# Patient Record
Sex: MALE | Race: BLACK OR AFRICAN AMERICAN
[De-identification: names, ages, dates, MRNs, and addresses within clinical notes are randomized per-mention and may not be internally consistent; named-entity substitution may affect disease eponyms.]

---

## 2020-08-25 NOTE — MRI REPORT
PROCEDURE:  Arthrogram Shoulder RT

 

INDICATIONS:  PAIN IN RT SHOULDER

 

CONTRAST: 12 mL of dilute intra-articular Gadolinium contrast solution

 

TECHNIQUE:  

After the administration of 12 mL of dilute intra-articular Gadolinium contrast, oblique coronal T1 a
nd T2 spin echo with fat saturation, oblique sagittal T1 spin echo with and without fat saturation, o
blique sagittal T2 fast spin echo with fat saturation, axial T1 spin echo with fat saturation through
 the shoulder.  

 

COMPARISON:   and fluoroscopic images from arthrogram injection performed earlier the same day.

 

FINDINGS:  

Image quality:  Excellent.  

 

Rotator cuff: There is mild supraspinatus and infraspinatus tendinosis. The teres minor tendon is int
act. The subscapularis tendon is also intact. There is no significant rotator cuff muscle atrophy.

 

Bones and bursae:  There is a small shallow chronic Hill-Sachs lesion in the posterior superior humer
al head measuring approximately 17 x 12 x 3 mm. No acute osseous edema is seen. Minimal degenerative 
changes are seen in the acromioclavicular joint. There is no significant subacromial/subdeltoid bursa
l fluid. 

 

Capsule and soft tissues:  Focal full-thickness cartilage loss is present at the anterior-inferior gl
enoid without significant loss of glenoid bone stock. There is tearing of the adjacent anterior infer
ior labrum with minimal displacement and an intact periosteal attachment. An additional small nondisp
laced tear is seen in the posterior superior labrum. The long head of the biceps tendon demonstrates 
normal location and morphology.  No intra-articular bodies.  

 

IMPRESSION:  

1.  Minimally displaced tear of the anterior inferior glenoid labrum with intact periosteal attachmen
t. An additional small nondisplaced tear is seen in the posterior superior labrum.

 

2.  Focal full-thickness cartilage loss at the anterior inferior glenoid without significant loss of 
glenoid bone stock.

 

3.  Small shallow chronic Hill-Sachs lesion at the posterior superior humeral head measuring 17 x 12 
x 3 mm.

 

4.  Mild supraspinatus and infraspinatus tendinosis.

 

Reviewed by: Joaquín Asher MD on 8/25/2020 1:31 PM PDT

Approved by: Joaquín Asher MD on 8/25/2020 1:31 PM PDT

 

 

Station ID:  [id number]

## 2020-08-25 NOTE — XRAY REPORT
PROCEDURE: Arthrogram Needle Placement

 

INDICATIONS: PAIN IN RT SHOULDER

   

TECHNIQUE:  

The indications, alternatives, benefits, risks, and complications of the procedure were explained to 
the patient.  Written informed consent was obtained and placed in the chart.  The shoulder was examin
ed fluoroscopically and a site for needle placement chosen for entry into the glenohumeral joint from
 an anterior approach.  The skin was prepped and draped in the usual fashion, and 1% lidocaine infilt
rated from skin down to joint capsule.  A spinal needle was inserted into the glenohumeral joint, and
 a small amount of iodinated contrast media injected to confirm intra-articular placement of the need
le tip.  This was followed by approximately 12 mL dilute solution of a gadolinium containing MR contr
ast agent.  

 

The needle was removed and a dressing was applied.  The patient was given postprocedural instructions
 and sent to the MR suite for MR imaging.  

 

FINDINGS:  

A single fluoroscopic spot image demonstrates intra-articular location of injected iodinated contrast
.

 

IMPRESSION:  

Successful fluoroscopically guided administration of dilute Gadolinium solution into the shoulder ritchie boucher for MR arthrogram.  

 

Reviewed by: Jeronimo Jensen MD on 8/25/2020 10:36 AM PDT

Approved by: Jeronimo Jensen MD on 8/25/2020 10:36 AM PDT

 

 

Station ID:  [id number]

## 2020-10-09 ENCOUNTER — HOSPITAL ENCOUNTER (OUTPATIENT)
Dept: HOSPITAL 76 - SDS | Age: 25
Discharge: HOME | End: 2020-10-09
Attending: ORTHOPAEDIC SURGERY
Payer: COMMERCIAL

## 2020-10-09 VITALS — SYSTOLIC BLOOD PRESSURE: 125 MMHG | DIASTOLIC BLOOD PRESSURE: 85 MMHG
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
## 2020-10-09 DIAGNOSIS — S46.811A: ICD-10-CM

## 2020-10-09 DIAGNOSIS — S43.491A: Primary | ICD-10-CM
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
## 2020-10-09 DIAGNOSIS — M65.811: ICD-10-CM

## 2020-10-09 DIAGNOSIS — S43.431A: ICD-10-CM

## 2020-10-09 DIAGNOSIS — M75.21: ICD-10-CM
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
## 2020-10-09 NOTE — OPERATIVE REPORT
Operative Report





- General


Procedure Date: 10/09/20





- Other


Other Information/Narrative: 





Date of Procedure: October 9, 2020





Planned Procedure: Right shoulder arthroscopy, labral repair, possible biceps 

tenodesis





Pre-op diagnosis: Right shoulder instability and labral tear





Procedure performed: Right shoulder arthroscopy, labral repair, open subpectoral

biceps tenodesis, GLAD debridement, limited intra-articular debridement





Post-op diagnosis: Right shoulder instability, anterior inferior labral tear, 

biceps tendinitis/tearing, anterior GLAD lesion, synovitis





Primary Surgeon: RAJINDER CHAND





Secondary Surgeon: MARLEY PEREZ





Anesthesia: General endotracheal, interscalene block





EBL: 10 ml





IMPLANTS: 


Arthrex knotless suture tack x5, Arthrex fiber tack x1 for biceps tenodesis





POSTOPERATIVE PLAN:


0-2 weeks-Sling at all times.  Pendulum exercises 5 times per day.  


2-6 weeks-Passive range of motion with the following limits: FF to 90, ER to 20,

abduction to 90


6-12 weeks-Active range of motion in all planes without limitation.  Isometric 

rotator cuff strengthening is allowed


12-16 weeks-Gradually increase strengthening


16 weeks and beyond-Introduce dynamic activities





EXAMINATION UNDER ANESTHESIA:


ROM: Forward flexion 180, abduction 180, ABER 90, ABIR 90


Anterior load and shift: 2+


Posterior load and shift: 1+


Inferior sulcus: Negative





ARTHROSCOPIC FINDINGS: 


Rotator interval: Synovitis and fraying in the rotator interval, fraying of the 

[facility] L


Biceps tendon & SLAP: The biceps tendon was longitudinally torn, and there is a 

type II SLAP lesion.  The biceps anchor was probed and felt to be unstable on 

the superior glenoid


Subscapularis: Intact


Rotator Cuff: Intact


HAGL: None


Labrum: The anterior and inferior labrum was somewhat diminutive, there was an 

unstable crack between the anterior labrum and the glenoid, this was explored 

and found to propagate to approximately the 7 o'clock position.  Posterior 

labrum was stable to probing.


Glenoid Cartilage: There is a large anterior glad lesion near the equator, this 

was debrided with a sucker shaver and partially covered during the labral 

repair.


Humeral Head Cartilage: Some mild anterior wear, small shallow Hill-Sachs lesion

posteriorly.





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

self-reduce. Since that time he has had multiple instability events.  

Nonoperative management failed to resolve symptoms.  The risks, benefits, and 

alternatives were discussed.  Risks included pain, bleeding, infection, damage 

to nearby structures, lack of symptom relief, implant complications, stiffness, 

need for further surgeries, DVT, PE, stroke, and even death.  He signed a 

written consent form.





PROCEDURE IN DETAIL: The patient was met in the preoperative holding on the day 

of the procedure.  Operative extremity was signed.  Consent was verified.  he 

desired to proceed.  Regional anesthesia was obtained in the preoperative area. 

They were brought to the operating room and surrendered to anesthesia.  Once 

general anesthesia was obtained they were placed in the lateral decubitus 

position with the operative side up.  An axillary roll was placed and all bony 

prominences were well-padded.  They were then prepped and draped in the standard

sterile fashion.  A surgical timeout was held to confirm the patient procedure, 

identity, procedure, laterality, allergies, images, and antibiotics.  All were 

in agreement we proceeded.  





Balanced suspension was applied and a standard diagnostic arthroscopy was 

performed utilizing posterior and anterior superior portal sites.  The anterior 

superior portal site was created under direct visualization.  The findings of 

the diagnostic arthroscopy can be found above.  The synovitis and frayed labral 

tissue were debrided to improve visibility. The biceps tendon was cut using a 

combination of a meniscal biter and an RF ablation wand. The stump was debrided 

to the level of the labrum. The frayed labral tissue in the SLAP region was 

debrided to a stable rim.





 An anterior mid glenoid portal told just above the subscapularis was 

established under direct visualization. The camera was shifted to the anterior-

superior viewing portal and then the labrum was prepared using a combination of 

high and low angled elevators, the arthroscopic sucker shaver and the pineapple 

rasp.  The extent of the labral tear was from approximately 3:00 anteriorly near

the GLAD lesion progressing inferiorly just past 6:00.  The labrum was 

systematically freed up until it had good mobility and the subscap was visible 

through the interval between the labrum and the glenoid.  The arthroscopic 

sucker shaver was used to debride residual soft issue in the GLAD lesion as well

as to debride the edge of the cartilage to make room for anchor placement.  The 

pineapple rasp was used to debride any remaining soft tissue off the glenoid 

neck.  A posterior percutaneous portal was established using the Arthrex system,

and anchors were sequentially placed from approximately 7:00 posterior moving 

anteriorly.  The first anchor was placed at 7 o'clock, and the appropriate 

suture lasso was used to pass the suture around the capsulolabral tissue.  The 

suture was then secured in knotless fashion with good tension. This process was 

repeated using the percutaneous portal for the 6 o'clock anchor.  These anchors 

resulted in a nice superior capsular shift, closing down some of the space in 

the pouch.  Anterior anchors were then placed through the mid glenoid portal at 

approximately the 5:00, 4:00 and 3 o'clock positions.  The uppermost anchors 

were brought slightly more onto the face to help cover the anterior portion of 

the glenoid lesion.  Following repair, there was recreation of good 

anteroinferior labral bumper.  Pictures were taken and then the lateral 

distraction was removed.  The humeral head appeared well centered on the 

glenoid.  Final pictures were taken and the fluid was drained from the shoulder 

and the cannulas removed. 





We then turned our attention to the biceps tenodesis, a 5 cm incision was made 

near the axillary fold centered over the pectoralis major tendon.  

Electrocautery was used to obtain hemostasis.  The fascia was opened with 

dissection scissors.  Blunt digital dissection was used to identify the 

intertubercular groove just under the pectoralis major tendon.  The long head of

the biceps tendon was visualized within this interval.  The short head of the 

biceps was retracted with my finger and the right angle was used to deliver the 

tendon of the long head of the biceps out of the wound.  A key elevator was used

to prepare the groove and a fibertack anchor was placed high in the groove in 

standard fashion.  The biceps tendon was debrided to remove the tenosynovium and

the tendon was marked 2cm proximal to the musculotendinous junction. The biceps 

tendon was then whipstitched using the suture from the fibertack anchor from the

2cm macy, to the musculotendinous junction, and back 2 cm proximal.  The suture 

was tensioned, reducing the tendon into the groove/prepared bed.  The tendon was

secured with 6 reversing half-hitches with alternating posts.  Following 

fixation there was appropriate tension on the biceps tendon.  The suture limbs 

were cut and the wound was irrigated.  The subcutaneous tissue was closed using 

interrupted 2-0 Vicryl followed by running 3-0 Monocryl in subcuticular fashion.





The portal sites were then closed with 3-0 Monocryl buried. Dermabond was 

applied to the biceps incision followed by ster-strips. Mastisol and Steri-

Strips were applied to the portals.  10 mL of quarter percent Marcaine was 

injected around the biceps tenodesis incision.  Xeroform was applied to the 

portal incisons followed by a sterile dressing and a sling.  He was awakened and

transferred to the recovery room.

## 2020-10-09 NOTE — ANESTHESIA
Pre-Anesthesia VS, & Labs





- Diagnosis





Right Labral Tear





- Procedure





Shoulder Arthroscopy with SLAP Repair


Vital Signs: 





                                        











Temp Pulse Resp BP Pulse Ox


 


 37.5 C   71   16   122/74   100 


 


 10/09/20 10:00  10/09/20 10:00  10/09/20 10:00  10/09/20 10:00  10/09/20 10:00











Height: 6 ft 4 in


Weight (kg): 88.45 kg


Body Mass Index: 23.7


BMI Classification: Healthy weight





- NPO


>8 hours





Home Medications and Allergies


Home Medications: 


Ambulatory Orders





No Known Home Medications  10/05/20 











                                        





No Known Home Medications  10/05/20 








Allergies/Adverse Reactions: 


                                    Allergies











Allergy/AdvReac Type Severity Reaction Status Date / Time


 


No Known Drug Allergies Allergy   Verified 10/05/20 14:58














Anes History & Medical History





- Anesthetic History


Anesthesia Complications: reports: No previous complications (Only had STO under

sedation)


Family history of Anesthesia Complications: Denies


Family history of Malignant Hyperthermia: Denies





- Medical History


Cardiovascular: reports: None


Pulmonary: reports: None


Gastrointestinal: reports: None


Urinary: reports: None


Musculoskeletal: reports: Other (Chronic intermittant back pain. Set up for PT.)


Endocrine/Autoimmune: reports: None


Skin: reports: None


Smoking Status: Never smoker


Psychosocial: reports: No issues indicated


History of Cancer?: No





- Surgical History


Eyes Ears Nose Throat (EENT): Other (STO)





Exam


General: Alert, Oriented x3, Cooperative, No acute distress


Dental: WNL


Mouth Opening: Greater than 4 Fingerbreadths


Neck Mobility: Normal


Mallampati classification: I


Thyromental Distance: greater than 6 cm


Respiratory: Lungs clear


Cardiovascular: Regular rate


Mental/Cognitive Status: Alert/Oriented X3





Plan


Anesthesia Type: General, Supraclavicular Block


Regional Block: Per Surgeon's request for Post Op pain control


Consent for Procedure(s) Verified and Reviewed: Yes


Code Status: Attempt Resuscitation


ASA classification: 1-Healthy patient


Is this case an emergency?: No (Discussed anesthetics. Quetions answered. 

Consent signed.)

## 2020-10-09 NOTE — ANESTHESIA POST OP EVALUATION
Anesthesia Post Eval





- Post Anesthesia Eval


Vitals: 





                                Last Vital Signs











Temp  36.2 C L  10/09/20 16:25


 


Pulse  63   10/09/20 16:55


 


Resp  17   10/09/20 16:55


 


BP  125/85 H  10/09/20 16:55


 


Pulse Ox  96   10/09/20 16:55











CV Function Including HR & BP: positive: Stable


Pain Control: positive: Satisfactory


Nausea & Vomiting: positive: Negative


Mental Status: positive: Baseline


Respiratory Status: Airway Patent


Hydration Status: Satisfactory

## 2021-08-16 ENCOUNTER — HOSPITAL ENCOUNTER (OUTPATIENT)
Dept: HOSPITAL 76 - EMS | Age: 26
End: 2021-08-16
Payer: COMMERCIAL
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
## 2021-08-16 DIAGNOSIS — S01.83XA: ICD-10-CM

## 2021-08-16 DIAGNOSIS — I46.8: Primary | ICD-10-CM
